# Patient Record
Sex: MALE | Race: WHITE | NOT HISPANIC OR LATINO | URBAN - METROPOLITAN AREA
[De-identification: names, ages, dates, MRNs, and addresses within clinical notes are randomized per-mention and may not be internally consistent; named-entity substitution may affect disease eponyms.]

---

## 2021-05-13 ENCOUNTER — EMERGENCY (EMERGENCY)
Facility: HOSPITAL | Age: 31
LOS: 1 days | Discharge: HOME | End: 2021-05-13
Attending: EMERGENCY MEDICINE
Payer: COMMERCIAL

## 2021-05-13 VITALS
HEART RATE: 87 BPM | TEMPERATURE: 99 F | SYSTOLIC BLOOD PRESSURE: 153 MMHG | RESPIRATION RATE: 16 BRPM | DIASTOLIC BLOOD PRESSURE: 96 MMHG | OXYGEN SATURATION: 98 % | WEIGHT: 315 LBS

## 2021-05-13 PROCEDURE — 99283 EMERGENCY DEPT VISIT LOW MDM: CPT

## 2021-05-13 PROCEDURE — 73610 X-RAY EXAM OF ANKLE: CPT | Mod: 26,RT

## 2021-05-13 PROCEDURE — 73610 X-RAY EXAM OF ANKLE: CPT

## 2021-05-13 PROCEDURE — 99283 EMERGENCY DEPT VISIT LOW MDM: CPT | Mod: 25

## 2021-05-13 RX ORDER — IBUPROFEN 200 MG
1 TABLET ORAL
Qty: 28 | Refills: 0
Start: 2021-05-13 | End: 2021-05-19

## 2021-05-13 RX ORDER — IBUPROFEN 200 MG
600 TABLET ORAL ONCE
Refills: 0 | Status: COMPLETED | OUTPATIENT
Start: 2021-05-13 | End: 2021-05-13

## 2021-05-13 RX ADMIN — Medication 600 MILLIGRAM(S): at 21:10

## 2021-05-13 RX ADMIN — Medication 600 MILLIGRAM(S): at 20:34

## 2021-05-13 NOTE — ED PROVIDER NOTE - PATIENT PORTAL LINK FT
You can access the FollowMyHealth Patient Portal offered by [facility] by registering at the following website: [email or web address]. By joining [employer]’s FollowMyHealth portal, you will also be able to view your health information using other applications (apps) compatible with our system.

## 2021-05-13 NOTE — ED ADULT TRIAGE NOTE - CHIEF COMPLAINT QUOTE
I fell on my right ankle while playing basketball.  I have right ankle pain, abrasion to left elbow and left knee

## 2021-05-13 NOTE — ED PROVIDER NOTE - PHYSICAL EXAMINATION
Swelling to the R lateral aspect of the ankle  No broken skin  . no discoloration  ROM painfully reduced  tenderness to palpation of the lateral R malleolus  Intact pulses and sensation  No foreign body

## 2021-05-13 NOTE — ED ADULT NURSE NOTE - NSIMPLEMENTINTERV_GEN_ALL_ED
Implemented All Fall Risk Interventions:  [place] to call system. Call bell, personal items and telephone within reach. Instruct patient to call for assistance. Room bathroom lighting operational. Non-slip footwear when patient is off stretcher. Physically safe environment: no spills, clutter or unnecessary equipment. Stretcher in lowest position, wheels locked, appropriate side rails in place. Provide visual cue, wrist band, yellow gown, etc. Monitor gait and stability. Monitor for mental status changes and reorient to person, place, and time. Review medications for side effects contributing to fall risk. Reinforce activity limits and safety measures with patient and family.

## 2021-05-13 NOTE — ED ADULT NURSE NOTE - OBJECTIVE STATEMENT
Pt c/o right ankle pain/injury after playing basketball. Pt states he has abrasion to left elbow and left knee. No acute distress noted, pt denies hitting head, no LOC, denies chest pain, no shortness of breath indicated.

## 2021-05-13 NOTE — ED PROVIDER NOTE - CLINICAL SUMMARY MEDICAL DECISION MAKING FREE TEXT BOX
Will r/o ankle fracture, x ray , pain control and reevaluate Will r/o ankle fracture, x ray , pain control and reevaluate.xrays evaluated.no obvious fracture/air splint/crutches/pain control

## 2021-05-13 NOTE — ED PROVIDER NOTE - CCCP TRG CHIEF CMPLNT
Chief Complaint   Patient presents with   • Tingling     bilateral hands x 30 min PTA via EMS. CMS intact. Denies cardiac Hx/stroke     Pt BIB EMS. Bilateral hands tingling x 30 min today but reoccurring x 2wks PTA. Denies numbness. Denies any medical Hx. CMS intact. Cap refill < 3 seconds.      Pt sts L CP x last 3 seconds when asked if he is having pain anywhere on body.  /72   Pulse 96   Temp 37 °C (98.6 °F) (Temporal)   Resp 18   Ht 1.829 m (6')   Wt 56.7 kg (125 lb)   SpO2 96%   BMI 16.95 kg/m²    right/ankle pain/injury

## 2021-05-13 NOTE — ED PROVIDER NOTE - OBJECTIVE STATEMENT
30 y.o male with no PMhx and no PSHx presents to the ED c/o R ankle injury while playing football. Patient endorsing pain and swelling. Patient denies any other acute complaints. LEIDYDA